# Patient Record
Sex: FEMALE | Race: WHITE | NOT HISPANIC OR LATINO | URBAN - METROPOLITAN AREA
[De-identification: names, ages, dates, MRNs, and addresses within clinical notes are randomized per-mention and may not be internally consistent; named-entity substitution may affect disease eponyms.]

---

## 2017-01-26 ENCOUNTER — OFFICE VISIT - RIVER FALLS (OUTPATIENT)
Dept: FAMILY MEDICINE | Facility: CLINIC | Age: 72
End: 2017-01-26

## 2017-01-26 ASSESSMENT — MIFFLIN-ST. JEOR: SCORE: 1231.94

## 2017-04-07 ENCOUNTER — OFFICE VISIT - RIVER FALLS (OUTPATIENT)
Dept: FAMILY MEDICINE | Facility: CLINIC | Age: 72
End: 2017-04-07

## 2017-04-07 ASSESSMENT — MIFFLIN-ST. JEOR: SCORE: 1231.94

## 2017-04-10 ENCOUNTER — OFFICE VISIT - RIVER FALLS (OUTPATIENT)
Dept: FAMILY MEDICINE | Facility: CLINIC | Age: 72
End: 2017-04-10

## 2017-04-10 ASSESSMENT — MIFFLIN-ST. JEOR: SCORE: 1258.59

## 2018-04-08 ENCOUNTER — OFFICE VISIT - RIVER FALLS (OUTPATIENT)
Dept: FAMILY MEDICINE | Facility: CLINIC | Age: 73
End: 2018-04-08

## 2021-06-17 ENCOUNTER — OFFICE VISIT - RIVER FALLS (OUTPATIENT)
Dept: FAMILY MEDICINE | Facility: CLINIC | Age: 76
End: 2021-06-17

## 2021-08-10 ENCOUNTER — OFFICE VISIT - RIVER FALLS (OUTPATIENT)
Dept: FAMILY MEDICINE | Facility: CLINIC | Age: 76
End: 2021-08-10

## 2022-02-12 VITALS
SYSTOLIC BLOOD PRESSURE: 126 MMHG | DIASTOLIC BLOOD PRESSURE: 87 MMHG | TEMPERATURE: 98.5 F | BODY MASS INDEX: 31.93 KG/M2 | BODY MASS INDEX: 32.29 KG/M2 | SYSTOLIC BLOOD PRESSURE: 138 MMHG | TEMPERATURE: 98 F | WEIGHT: 179.4 LBS | WEIGHT: 177.4 LBS | DIASTOLIC BLOOD PRESSURE: 74 MMHG | HEART RATE: 65 BPM | HEART RATE: 71 BPM

## 2022-02-12 VITALS
HEIGHT: 63 IN | HEART RATE: 68 BPM | DIASTOLIC BLOOD PRESSURE: 96 MMHG | HEART RATE: 77 BPM | WEIGHT: 178 LBS | BODY MASS INDEX: 31.83 KG/M2 | BODY MASS INDEX: 31.54 KG/M2 | DIASTOLIC BLOOD PRESSURE: 92 MMHG | WEIGHT: 173 LBS | TEMPERATURE: 97.6 F | SYSTOLIC BLOOD PRESSURE: 144 MMHG | HEIGHT: 62 IN | SYSTOLIC BLOOD PRESSURE: 163 MMHG

## 2022-02-12 VITALS
HEIGHT: 62 IN | WEIGHT: 173 LBS | BODY MASS INDEX: 31.83 KG/M2 | SYSTOLIC BLOOD PRESSURE: 141 MMHG | HEART RATE: 63 BPM | DIASTOLIC BLOOD PRESSURE: 79 MMHG

## 2022-02-12 VITALS
DIASTOLIC BLOOD PRESSURE: 82 MMHG | BODY MASS INDEX: 33.33 KG/M2 | WEIGHT: 185.2 LBS | OXYGEN SATURATION: 97 % | SYSTOLIC BLOOD PRESSURE: 162 MMHG | HEART RATE: 67 BPM | TEMPERATURE: 97.6 F

## 2022-02-15 NOTE — TELEPHONE ENCOUNTER
---------------------  From: Yaima Rivas CMA (Phone Messages Pool ([id number]_[facility]))   To: JDL Message Pool ([id number]_[place] [facility] [place]);     Sent: 6/17/2021 9:41:31 AM CDT  Subject: phone note- CPAP supplies rx     Phone Message    PCP:    none      Time of Call:  9:36am       Person Calling:  Kelly  Phone number:  [phone]    Note:  Patient called stating [facility] is requesting new rx for patient to get her CPAP supplies. Patient is wondering if [facility] can write rx and fax to [facility]? Please advise      Last office visit and reason:  CELSO 4/7/17 4/8/18 allergic reaction    Transferred to: JDL message pool---------------------  From: Ta/Zunilda WINSTON ([facility] Message Pool ([id number]_[place] [facility] [place]))   To: Ramon Solis MD;     Sent: 6/17/2021 9:58:18 AM CDT  Subject: FW: phone note- CPAP supplies rx---------------------  From: Ramon Solis MD   To: [facility]DL Message Pool ([id number]_[place] [facility] [place]);     Sent: 6/17/2021 10:20:19 AM CDT  Subject: RE: phone note- CPAP supplies rx     Need a device download ain visit. I last sqw her in 2017Patient notified and was transferred to scheduling.

## 2022-02-15 NOTE — PROGRESS NOTES
Chief Complaint    Patient here today to f/u sleep apnea- CPAP compliance.  History of Present Illness       Patient with moderate obstructive sleep apnea here for follow-up.  She uses the device every night but does not believe she feels any better than before starting.  She has no daytime sleepiness.  Blood pressure is controlled.  Review of Systems       No heartburn, headache, edema, chest pain, dyspnea.  Physical Exam   Vitals & Measurements    T: 98.0  F (Tympanic)  HR: 65 (Peripheral)  BP: 126/87     WT: 179.4 lb        Patient is a healthy-appearing woman in no distress.  Alert and oriented.  Mallampati 1.  Assessment/Plan       Moderate obstructive sleep apnea (G47.33)          Patient is subjectively and objectively compliant with CPAP and objectively benefiting with greatly decreased apnea-hypopnea index.  Unfortunately she has not noticed any difference in the way she feels.  Not sleepy.  Continue to use CPAP.         Ordered:          87628 office o/p est low 20-29 min (Charge), Quantity: 1, Moderate obstructive sleep apnea  Obese                Obese (Probable) (E66.9)          Encouraged weight loss.         Ordered:          33424 office o/p est low 20-29 min (Charge), Quantity: 1, Moderate obstructive sleep apnea  Obese           Patient Information     Name:MÓNICA RODNEY      Address:      [address] [place] [phone]     Sex:Female     YOB: 1945     [phone]     Emergency Contact:MARVEL JAMES     MRN:[id number]     FIN:[id number]     Location:[facility]     Date of Service:08/10/2021      Primary Care Physician:       NONE ,       Attending Physician:       Ramon Solis MD, [phone]  Problem List/Past Medical History    Ongoing     Allergic rhinitis     Allergy to food     DDD (degenerative disc disease), lumbar     DJD (degenerative joint disease)     Factor II deficiency       Comments: Clotting risk     Fibromyalgia     GERD  (gastroesophageal reflux disease)     Hyperlipidemia     Hypertension     Hypothyroidism     Moderate obstructive sleep apnea       Comments: HST on 1/9/17 with AHI 22.7 and oximetry alyx 80%.     Obese     Recurrent sinusitis     Seasonal allergies     Tubulovillous adenoma    Historical     *Hospitalized@[facility] - Chest pain     Chicken pox     Pregnancy     Pregnancy     Pregnancy  Procedure/Surgical History     Polysomnogram - HST (01/09/2017)      Comments: AHI of 22.7/hr.  Lowest Desat 80%.     Laparoscopic cholecystectomy (03/31/2016)     Cardiac computed tomography for calcium scoring (03/28/2016)      Comments: Total calcium score is 1.     Colonoscopy (04/24/2015)      Comments: Sedation: MAC      Indication: personal history of colon polps      4-6mm sessile serrated x1, hyperplastic x2      Repeat in 5 years.     L4 - L5 spinal decompression (05/15/2014)     Colonoscopy (04/16/2010)      Comments: Pt had hyperplastic polyps x2, no family hx of colon polyps/cancer and no personal hx colon polyps/cancer.   Pt to repeat in 10 yrs--due 4/16/2020 w/ MAC sedation.. Per previous colonoscopy done in 1998, pt had tubular adenoma and does require 5yr f/u colonoscopies.. Pt had hyperplastic polyps x2 measuring 6mm apiece.   Pt also received MAC sedation.. Return 5-10 years.     FS - Flexible sigmoidoscopy (03/04/2005)     Colonoscopy (01/11/2000)     Appendectomy (2000)     Colonoscopy (12/1998)     Polypectomy (1998)     Esophagoduodenostomy (1989)     Hysterectomy (1988)     Carpal tunnel release (1985)     History of shoulder surgery (1984)      Comments: Left.  Medications    Albuterol (Eqv-ProAir HFA) 90 mcg/inh inhalation aerosol, 2 puff(s), Inhale, q6 hrs    atenolol 25 mg oral tablet, 25 mg= 1 tab(s), Oral, daily    Auto PAP tiral 4- 10 cm water pressure, See Instructions, 11 refills    AutoPAP 4-11 cm water pressure, See Instructions, 11 refills    calcium-vitamin D, 500 mg, bid    CeleBREX 200 mg oral  capsule, 200 mg= 1 cap(s), Oral, daily, 3 refills    cetirizine 10 mg oral tablet, 10 mg= 1 tab(s), Oral, daily, PRN    Daily Multiple Vitamins, Oral, daily    Ecotrin 325 mg oral delayed release tablet, 325 mg= 1 tab(s), Oral, daily    levothyroxine 112 mcg (0.112 mg) oral tablet, 112 mcg= 1 tab(s), Oral, daily, 3 refills    omeprazole 20 mg oral delayed release capsule, 20 mg= 1 cap(s), Oral, daily, 3 refills    Simply Sleep, 25 mg, Oral, hs    triamcinolone 0.1% topical cream, 1 karuna, Topical, tid  Allergies    Cipro (Hives)    Crestor (abd pain)    Feldene (Hives)    Flax Seed Oil (face swelling)    aspirin (GI Upset)  Social History    Smoking Status     Never smoker     Alcohol      Current, Wine (5 oz), 1 time per month, 1 drinks/episode average.     Electronic Cigarette/Vaping      Electronic Cigarette Use: Never.     Employment/School      Retired     Exercise      Exercise frequency: No regular exercise..     Home/Environment      Marital status: . Spouse/Partner name: Ed Quiñones. Risks in environment: Unlocked guns.     Nutrition/Health      Type of diet: Regular.     Sexual      Sexually active: No. Sexual orientation: Heterosexual.     Substance Abuse      Never     Tobacco      Never (less than 100 in lifetime)  Family History    Alzheimer disease: Mother.    Arthritis: Sister, Brother and Brother.    Cancer: Father.    Clotting disorder: Sister.    Diverticulitis: Mother.    Heart attack: Son.    Hypertension: Mother, Brother, Brother and Daughter.    Sleep apnea syndrome: Sister, Brother and Brother.    Spina bifida: Son.    Thyroid disorder: Mother.    Son: History is negative  Immunizations          Scheduled Immunizations          Dose Date(s)          influenza virus vaccine, inactivated          10/14/2013          pneumococcal (PCV13)          03/25/2015          tetanus/diphth/pertuss (Tdap) adult/adol          07/26/2016          Other Immunizations          influenza           09/15/2009          pneumococcal          11/01/2005          influenza virus vaccine, inactivated          10/03/2011, 10/01/2012, 09/19/2014, 10/06/2015, 10/12/2016          pneumococcal (PPSV23)          11/02/2005          Td          02/18/2005, 02/18/2005          ZOS, shingles          01/01/2015          influenza, H1N1, inactivated          12/10/2009, 10/11/2010  Diagnostic Results    CPAP compliance report reveals usage on 100% of nights over 4 hours with an average of 7 hours and 32 minutes per night.  AHI 1.6.

## 2022-02-15 NOTE — PROGRESS NOTES
Chief Complaint  follow up sleep study  History of Present Illness  The patient has been on AutoPap for a month and feels much better her energy level and mood are greatly improved is very satisfied with it.  Review of Systems  No heartburn, weight gain, nocturia, headache, chest pain, dyspnea, edema.  Problem List/Past Medical History  Ongoing  Allergic Rhinitis  DDD (Degenerative Disc Disease), Lumbar  DJD (Degenerative Joint Disease)  Factor II deficiency  Fibromyalgia  GERD (Gastroesophageal Reflux Disease)  Hyperlipidemia  Hypertension  Hypothyroidism  Moderate obstructive sleep apnea  Obese  Recurrent Sinusitis  Seasonal Allergies  Tubulovillous Adenoma  Resolved  *Hospitalized@[facility] - Chest pain  Chicken Pox  Pregnancy  Pregnancy  Pregnancy  Procedure/Surgical History  Polysomnogram (01/09/2017),  Laparoscopic cholecystectomy (03/31/2016),  Cardiac computed tomography for calcium scoring (03/28/2016),  Colonoscopy (04/24/2015),  L4 - L5 spinal decompression (05/15/2014),  Colonoscopy (04/16/2010),  FS - Flexible sigmoidoscopy (03/04/2005),  Colonoscopy (01/11/2000),  Appendectomy (2000),  Colonoscopy (12/1998),  Polypectomy (1998),  Esophagoduodenostomy (1989),  Hysterectomy (1988),  Carpal tunnel release (1985),  History of shoulder surgery (1984).  Home Medications  atenolol 25 mg oral tablet, 25 mg, 1 tab(s), po, daily, 3 refills  Auto PAP tiral 4- 10 cm water pressure, 11 refills  calcium-vitamin D, 500 mg, bid  CeleBREX 200 mg oral capsule, 200 mg, 1 cap(s), po, daily, 3 refills  Daily Multiple Vitamins, po, daily  Ecotrin 325 mg oral delayed release tablet, 325 mg, 1 tab(s), po, daily  levothyroxine 112 mcg (0.112 mg) oral tablet, 112 mcg, 1 tab(s), po, daily  omeprazole 20 mg oral delayed release capsule, 20 mg, 1 cap(s), po, daily  Allergies  Cipro?(Hives)  Crestor?(abd pain)  Feldene?(Hives)  Flax Seed Oil?(face swelling)  aspirin?(GI Upset)  Social History  Alcohol  1-2 times per month  Employment  and Education  Retired  Exercise and Physical Activity  Exercise frequency: No regular exercise..  Home and Environment  Marital status: . Spouse/Partner name: Ed Quiñones.  Family History  Alzheimer disease: Mother.  Cancer: Father.  Clotting disorder: Sister.  Diverticulitis: Mother.  Hypertension: Mother , Brother , Brother  and Daughter.  Sleep apnea syndrome: Sister , Brother  and Brother.  Spina bifida: Son.  Thyroid disorder: Mother.  Immunizations  Physical Exam  Vitals & Measurements  HR:?77?(Peripheral)?  BP:?163/92?  HT:?62.25?in?  WT:?173?lb?  BMI:?31.39?  Patient is in no distress. ?Alert and oriented.  Lab Results  Diagnostic Results  AutoPap reveals average usage 7 hours and 15 minutes she is using over 4 hours every night 95th percentile for pressure is 10.9 cm of water pressure AHI is 2.3 acceptable week.  Assessment/Plan  Moderate obstructive sleep apnea  Subjectively and objectively compliant with CPAP. ?Subjectively and objectively improved with CPAP.  Continue AutoPap for CPAP 11 cm of water pressure alternatively.

## 2022-02-15 NOTE — PROGRESS NOTES
Chief Complaint  sleep study results  History of Present Illness  We do not stock is a 71-year-old here for follow-up of sleep test. ?She is snoring, witnessed apneas, and daytime sleepiness of 20 years duration. ?She sleeps from approximately . ?She has hypertension.  Review of Systems  She has a history of acid reflux. ?No recent weight gain. ?Nocturia 0-1 times per night. ?No headache. ?No chest pain, dyspnea, edema. ?Her 3 siblings all have sleep apnea.  Problem List/Past Medical History  Ongoing  Allergic Rhinitis  DDD (Degenerative Disc Disease), Lumbar  DJD (Degenerative Joint Disease)  Factor II deficiency  Fibromyalgia  GERD (Gastroesophageal Reflux Disease)  Hyperlipidemia  Hypertension  Hypothyroidism  Moderate obstructive sleep apnea  Obese  Recurrent Sinusitis  Seasonal Allergies  Tubulovillous Adenoma  Resolved  *Hospitalized@[facility] - Chest pain  Chicken Pox  Pregnancy  Pregnancy  Pregnancy  Procedure/Surgical History  Polysomnogram (01/09/2017),  Laparoscopic cholecystectomy (03/31/2016),  Cardiac computed tomography for calcium scoring (03/28/2016),  Colonoscopy (04/24/2015),  L4 - L5 spinal decompression (05/15/2014),  Colonoscopy (04/16/2010),  FS - Flexible sigmoidoscopy (03/04/2005),  Colonoscopy (01/11/2000),  Appendectomy (2000),  Colonoscopy (12/1998),  Polypectomy (1998),  Esophagoduodenostomy (1989),  Hysterectomy (1988),  Carpal tunnel release (1985),  History of shoulder surgery (1984).  Home Medications  atenolol 25 mg oral tablet, 25 mg, 1 tab(s), po, daily, 3 refills  Auto PAP tiral 4- 10 cm water pressure, 11 refills  calcium-vitamin D, 500 mg, bid  CeleBREX 200 mg oral capsule, 200 mg, 1 cap(s), po, daily, 3 refills  Daily Multiple Vitamins, po, daily  Ecotrin 325 mg oral delayed release tablet, 325 mg, 1 tab(s), po, daily  levothyroxine 112 mcg (0.112 mg) oral tablet, 112 mcg, 1 tab(s), po, daily, 3 refills  omeprazole 20 mg oral delayed release capsule, 20 mg, 1 cap(s), po,  daily, 3 refills  Allergies  Cipro?(Hives)  Crestor?(abd pain)  Feldene?(Hives)  Flax Seed Oil?(face swelling)  aspirin?(GI Upset)  Social History  Alcohol  1-2 times per month  Employment and Education  Retired  Exercise and Physical Activity  Exercise frequency: No regular exercise..  Home and Environment  Marital status: . Spouse/Partner name: Ed Quiñones.  Family History  Alzheimer disease: Mother.  Cancer: Father.  Clotting disorder: Sister.  Diverticulitis: Mother.  Hypertension: Mother , Brother , Brother  and Daughter.  Sleep apnea syndrome: Sister , Brother  and Brother.  Spina bifida: Son.  Thyroid disorder: Mother.  Immunizations  Physical Exam  Vitals & Measurements  HR:?63?(Peripheral)?  BP:?141/79?  HT:?62.25?in?  WT:?173?lb?  BMI:?31.39?  Patient is alert and oriented. ?In no distress. ?Airway Mallampati 3. ?Neck supple no adenopathy or thyromegaly. ?Chest clear to auscultation percussion precordium regular. ?Extremities have no edema.  Lab Results  Diagnostic Results  HST revealed an AHI of 22 and oximetry alyx of 80%.  Assessment/Plan  Hypertension  .  Ordered:  Miscellaneous Rx Supply, Auto PAP tiral 4- 10 cm water pressure, See Instructions, Instructions: Heated humidifier, heated tubing, filters, nasal or full face mask of choice with headgear. Replacement cushions and supplies as needed. Change supplies every 6 mos Months = 99...  Moderate obstructive sleep apnea  Moderate and perhaps severe obstructive sleep apnea. ?Discussed in lab versus home AutoPap titration she prefers the latter  Ordered:  Miscellaneous Rx Supply, Auto PAP tiral 4- 10 cm water pressure, See Instructions, Instructions: Heated humidifier, heated tubing, filters, nasal or full face mask of choice with headgear. Replacement cushions and supplies as needed. Change supplies every 6 mos Months = 99...  Return to Clinic (Request)

## 2022-02-15 NOTE — PROGRESS NOTES
Chief Complaint    Needing Rx for CPAP supplies to [facility] in .  History of Present Illness       Patient would like a prescription for CPAP.  She is very happy with his CPAP.  She uses it every night.  No daytime sleepiness.  Her blood pressure is controlled by her home readings.  Review of Systems       No heartburn, headache, edema, chest pain, dyspnea.  Physical Exam   Vitals & Measurements    T: 98.5  F (Tympanic)  HR: 71 (Peripheral)  BP: 138/74     WT: 177.4 lb        Patient is a healthy-appearing woman in no distress.  Alert and oriented.  HEENT exam is Mallampati 1.  Neck is not particularly thick.  Chest clear.  Cardiac exam regular.  No edema.  Cranial nerves normal.  Assessment/Plan       Hypertension (I10)          Controlled by patient's home readings.         Ordered:          Miscellaneous Rx Supply, AutoPAP 4-11 cm water pressure, See Instructions, Instructions: Heated humidifier, heated tubing, filters, nasal or full face mask of choice with headgear. Replacement cushions and supplies as needed. Change supplies every 6 mos Months = 99 (Lifeti..., (Ordered)          42242 office o/p est low 20-29 min (Charge), Quantity: 1, Moderate obstructive sleep apnea  Hypertension                Moderate obstructive sleep apnea (G47.33)          Patient is subjectively and objectively compliant with CPAP and clinically benefiting with decreased sleepiness and decreased AHI.Pop         Ordered:          Miscellaneous Rx Supply, AutoPAP 4-11 cm water pressure, See Instructions, Instructions: Heated humidifier, heated tubing, filters, nasal or full face mask of choice with headgear. Replacement cushions and supplies as needed. Change supplies every 6 mos Months = 99 (Lifeti..., (Ordered)          51729 office o/p est low 20-29 min (Charge), Quantity: 1, Moderate obstructive sleep apnea  Hypertension           Patient Information     Name:MÓNICA RODNEY      Address:      [address] [place] [address]      [place] [id number]     Sex:Female     YOB: 1945     [phone]     Emergency Contact:MARVEL JAMES     MRN:[id number]     FIN:[id number]     Location:[facility]     Date of Service:06/17/2021      Primary Care Physician:       NONE ,       Attending Physician:       Ramon Solis MD, [phone]  Problem List/Past Medical History    Ongoing     Allergic rhinitis     Allergy to food     DDD (degenerative disc disease), lumbar     DJD (degenerative joint disease)     Factor II deficiency       Comments: Clotting risk     Fibromyalgia     GERD (gastroesophageal reflux disease)     Hyperlipidemia     Hypertension     Hypothyroidism     Moderate obstructive sleep apnea       Comments: HST on 1/9/17 with AHI 22.7 and oximetry alyx 80%.     Obese     Recurrent sinusitis     Seasonal allergies     Tubulovillous adenoma    Historical     *Hospitalized@[facility] - Chest pain     Chicken pox     Pregnancy     Pregnancy     Pregnancy  Procedure/Surgical History     Polysomnogram - HST (01/09/2017)      Comments: AHI of 22.7/hr.  Lowest Desat 80%.     Laparoscopic cholecystectomy (03/31/2016)     Cardiac computed tomography for calcium scoring (03/28/2016)      Comments: Total calcium score is 1.     Colonoscopy (04/24/2015)      Comments: Sedation: MAC      Indication: personal history of colon polps      4-6mm sessile serrated x1, hyperplastic x2      Repeat in 5 years.     L4 - L5 spinal decompression (05/15/2014)     Colonoscopy (04/16/2010)      Comments: Pt had hyperplastic polyps x2, no family hx of colon polyps/cancer and no personal hx colon polyps/cancer.   Pt to repeat in 10 yrs--due 4/16/2020 w/ MAC sedation.. Per previous colonoscopy done in 1998, pt had tubular adenoma and does require 5yr f/u colonoscopies.. Return 5-10 years. Pt had hyperplastic polyps x2 measuring 6mm apiece.   Pt also received MAC sedation..     FS - Flexible sigmoidoscopy (03/04/2005)     Colonoscopy (01/11/2000)      Appendectomy (2000)     Colonoscopy (12/1998)     Polypectomy (1998)     Esophagoduodenostomy (1989)     Hysterectomy (1988)     Carpal tunnel release (1985)     History of shoulder surgery (1984)      Comments: Left.  Medications    atenolol 25 mg oral tablet, 25 mg= 1 tab(s), Oral, daily    Auto PAP tiral 4- 10 cm water pressure, See Instructions, 11 refills    AutoPAP 4-11 cm water pressure, See Instructions, 11 refills    calcium-vitamin D, 500 mg, bid    CeleBREX 200 mg oral capsule, 200 mg= 1 cap(s), Oral, daily, 3 refills    cetirizine 10 mg oral tablet, 10 mg= 1 tab(s), Oral, daily, PRN    Daily Multiple Vitamins, Oral, daily    Ecotrin 325 mg oral delayed release tablet, 325 mg= 1 tab(s), Oral, daily    levothyroxine 112 mcg (0.112 mg) oral tablet, 112 mcg= 1 tab(s), Oral, daily, 3 refills    metoprolol tartrate 25 mg oral tablet, 25 mg= 1 tab(s), Oral, bid    omeprazole 20 mg oral delayed release capsule, 20 mg= 1 cap(s), Oral, daily, 3 refills    predniSONE 20 mg oral tablet, 40 mg= 2 tab(s), Oral, daily    Simply Sleep, 25 mg, Oral, hs    triamcinolone 0.1% topical cream, 1 karuna, Topical, tid  Allergies    Cipro (Hives)    Crestor (abd pain)    Feldene (Hives)    Flax Seed Oil (face swelling)    aspirin (GI Upset)  Social History    Smoking Status     Never smoker     Alcohol      Current, Wine (5 oz), 1 time per month, 1 drinks/episode average.     Electronic Cigarette/Vaping      Electronic Cigarette Use: Never.     Employment/School      Retired     Exercise      Exercise frequency: No regular exercise..     Home/Environment      Marital status: . Spouse/Partner name: Ed Quiñones. Risks in environment: Unlocked guns.     Nutrition/Health      Type of diet: Regular.     Sexual      Sexually active: No. Sexual orientation: Heterosexual.     Substance Abuse      Never     Tobacco      Never (less than 100 in lifetime)  Family History    Alzheimer disease: Mother.    Arthritis: Sister, Brother  and Brother.    Cancer: Father.    Clotting disorder: Sister.    Diverticulitis: Mother.    Heart attack: Son.    Hypertension: Mother, Brother, Brother and Daughter.    Sleep apnea syndrome: Sister, Brother and Brother.    Spina bifida: Son.    Thyroid disorder: Mother.    Son: History is negative  Immunizations          Scheduled Immunizations          Dose Date(s)          influenza virus vaccine, inactivated          10/14/2013          pneumococcal (PCV13)          03/25/2015          tetanus/diphth/pertuss (Tdap) adult/adol          07/26/2016          Other Immunizations          influenza          09/15/2009          pneumococcal          11/01/2005          influenza virus vaccine, inactivated          10/03/2011, 10/01/2012, 09/19/2014, 10/06/2015, 10/12/2016          pneumococcal (PPSV23)          11/02/2005          Td          02/18/2005, 02/18/2005          ZOS, shingles          01/01/2015          influenza, H1N1, inactivated          12/10/2009, 10/11/2010  Diagnostic Results    Compliance report indicates usage of over 4 hours per night on 97% of nights, average 7 hours and 7 minutes per night, AHI 2.0.

## 2022-02-15 NOTE — NURSING NOTE
Quick Intake Entered On:  6/17/2021 2:03 PM CDT    Performed On:  6/17/2021 2:02 PM CDT by Ramon Solis MD               Summary   Menstrual Status :   Postmenopausal   Systolic Blood Pressure :   138 mmHg (HI)    Diastolic Blood Pressure :   74 mmHg   Mean Arterial Pressure :   95 mmHg   BP Site :   Right arm   BP Method :   Manual   Race :      Languages :   English   Ethnicity :   Not  or    Ramon Solis MD - 6/17/2021 2:02 PM CDT

## 2022-02-15 NOTE — PROGRESS NOTES
Patient:   MÓNICA RODNEY            MRN: [id number]            FIN: [id number]               Age:   73 years     Sex:  Female     :  1945   Associated Diagnoses:   Allergic dermatitis   Author:   Rigo Farias MD      Visit Information      Date of Service: 2018 10:56 am  Performing Location: [facility]  Encounter#: [id number]      Primary Care Provider (PCP):  NONE ,       Referring Provider:  Rigo Farias MD    NPI# [id number]      Chief Complaint   2018 10:59 AM CDT    Possible allergic reaction to facial cream.  Face is hot, eyes swollen and itching.      Subjective   Chief complaint 2018 10:59 AM CDT    Possible allergic reaction to facial cream.  Face is hot, eyes swollen and itching..     This 73-year-old is here because her face is hot and swollen.  It started yesterday.  She applied a new Neutrogena face cream.  It is one she had been using for a long time but this had sunscreen in it.  She says that is what she seemed to react to.  She took some Benadryl last night, which helped, but it was still there this morning so she decided to come in.  She has had no trouble with her breathing.  No throat swelling.  She can eat well.  No nausea or vomiting.             Health Status   Allergies:    Allergic Reactions (Selected)  Severity Not Documented  Cipro (Hives)  Crestor (Abd pain)  Feldene (Hives)  Flax Seed Oil (Face swelling)  Nonallergic Reactions (Selected)  Severity Not Documented  Aspirin (Gi upset)   Medications:  (Selected)   Prescriptions  Prescribed  Auto PAP tiral 4- 10 cm water pressure: Auto PAP tiral 4- 10 cm water pressure, See Instructions, Instructions: Heated humidifier, heated tubing, filters, nasal or full face mask of choice with headgear.  Replacement cushions and supplies as needed.  Change supplies every 6 mos  Months = 99...  CeleBREX 200 mg oral capsule: 1 cap(s) ( 200 mg ), PO, Daily, # 90 cap(s), 3 Refill(s), Type:  Maintenance  cetirizine 10 mg oral tablet: 1 tab(s) ( 10 mg ), PO, Daily, PRN: for allergy symptoms, # 10 tab(s), 0 Refill(s), Type: Maintenance, Pharmacy: [id number], 1 tab(s) po daily,PRN:for allergy symptoms  levothyroxine 112 mcg (0.112 mg) oral tablet: 1 tab(s) ( 112 mcg ), po, daily, # 90 tab(s), 3 Refill(s), Type: Maintenance, Pharmacy: [facility], 1 tab(s) po daily  omeprazole 20 mg oral delayed release capsule: 1 cap(s) ( 20 mg ), po, daily, # 90 cap(s), 3 Refill(s), Type: Maintenance, Pharmacy: [facility], due for visit, 1 cap(s) po daily  predniSONE 20 mg oral tablet: 2 tab(s) ( 40 mg ), PO, Daily, # 6 tab(s), 0 Refill(s), Type: Maintenance, Pharmacy: [id number], 2 tab(s) po daily,x3 day(s)  triamcinolone 0.1% topical cream: 1 karuna, TOP, TID, Instructions: apply a thin film  to affected area, # 30 g, 0 Refill(s), Type: Maintenance, Pharmacy: [id number], 1 karuna top tid,Instr:apply a thin film; to affected area  Documented Medications  Documented  Daily Multiple Vitamins: PO, Daily, 0  Ecotrin 325 mg oral delayed release tablet: 1 tab(s) ( 325 mg ), PO, Daily, # 30 tab(s), 0 Refill(s), Type: Maintenance  atorvastatin 20 mg oral tablet: 1 tab(s) ( 20 mg ), PO, every other day, # 90 tab(s), 0 Refill(s), Type: Maintenance  calcium-vitamin D: ( 500 mg ), BID, 0  metoprolol tartrate 25 mg oral tablet: 1 tab(s) ( 25 mg ), po, bid, 0 Refill(s), Type: Maintenance   Problem list:    All Problems (Selected)  Hypothyroidism / 94105591 / Confirmed  DDD (degenerative disc disease), lumbar / 45575570 / Confirmed  GERD (gastroesophageal reflux disease) / 6406163666 / Confirmed  Hyperlipidemia / 02607250 / Confirmed  DJD (degenerative joint disease) / 0304995514 / Confirmed  Recurrent sinusitis / 414471049 / Confirmed  Tubulovillous adenoma / 78314859 / Confirmed  Allergic rhinitis / 983786510 / Confirmed  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      Objective   Vital Signs   4/8/2018 10:59 AM CDT Temperature Tympanic 97.6 DegF  LOW    Peripheral Pulse Rate 67 bpm    Systolic Blood Pressure 162 mmHg  HI    Diastolic Blood Pressure 82 mmHg  HI    Mean Arterial Pressure 109 mmHg    Oxygen Saturation 97 %      Measurements from flowsheet : Measurements   4/8/2018 10:59 AM CDT    Weight Measured - Standard                185.2 lb     General:  Alert and oriented, No acute distress.    Neck:  No lymphadenopathy.    Respiratory:  Lungs are clear to auscultation, Respirations are non-labored.    Cardiovascular:  Normal rate.    Face has a pink, slightly swollen nature to it.   There are no specific lesions.        Impression and Plan   Assessment and Plan:          Diagnosis: Allergic dermatitis ([id number] L23.9).         Course: Allergic reaction.    She will try Cetirizine, three days of Prednisone, and some Triamcinolone.  She will not use the cream with sunscreen any more .

## 2022-02-15 NOTE — NURSING NOTE
Comprehensive Intake Entered On:  8/10/2021 10:57 AM CDT    Performed On:  8/10/2021 10:52 AM CDT by Zunilda Little               Summary   Chief Complaint :   Patient here today to f/u sleep apnea- CPAP compliance.    Menstrual Status :   Postmenopausal   Weight Measured :   179.4 lb(Converted to: 179 lb 6 oz, 81.374 kg)    Systolic Blood Pressure :   126 mmHg   Diastolic Blood Pressure :   87 mmHg (HI)    Mean Arterial Pressure :   100 mmHg   Peripheral Pulse Rate :   65 bpm   BP Site :   Right arm   BP Method :   Electronic   HR Method :   Electronic   Temperature Tympanic :   98.0 DegF(Converted to: 36.7 DegC)    Race :      Languages :   English   Ethnicity :   Not  or    Zunilda Little - 8/10/2021 10:52 AM CDT   Health Status   Allergies Verified? :   Yes   Medication History Verified? :   Yes   Medical History Verified? :   Yes   Pre-Visit Planning Status :   Completed   Tobacco Use? :   Never smoker   Zunilda Little - 8/10/2021 10:52 AM CDT   Consents   Consent for Immunization Exchange :   Consent Granted   Consent for Immunizations to Providers :   Consent Granted   Zunilda Little - 8/10/2021 10:52 AM CDT   Meds / Allergies   (As Of: 8/10/2021 10:57:14 AM CDT)   Allergies (Active)   aspirin  Estimated Onset Date:   Unspecified ; Reactions:   GI Upset ; Created By:   Litzy Marquez; Reaction Status:   Active ; Category:   Drug ; Substance:   aspirin ; Type:   Intolerance ; Updated By:   Litzy Marquez; Source:   Paper Chart ; Reviewed Date:   8/10/2021 10:56 AM CDT      Cipro  Estimated Onset Date:   Unspecified ; Reactions:   Hives ; Created By:   Sylvie Caicedo; Reaction Status:   Active ; Category:   Drug ; Substance:   Cipro ; Type:   Allergy ; Updated By:   Sylvie Caicedo; Source:   Paper Chart ; Reviewed Date:   8/10/2021 10:56 AM CDT      Crestor  Estimated Onset Date:   Unspecified ; Reactions:   abd pain ; Created By:   Dayan Fernandez CMA;  Reaction Status:   Active ; Category:   Drug ; Substance:   Crestor ; Type:   Allergy ; Updated By:   Dayan Fernandez CMA; Source:   Patient ; Reviewed Date:   8/10/2021 10:56 AM CDT      Feldene  Estimated Onset Date:   <not entered> 1/1/1993 ; Reactions:   Hives ; Created By:   Litzy Marquez; Reaction Status:   Active ; Category:   Drug ; Substance:   Feldene ; Type:   Allergy ; Updated By:   Litzy Marquez; Source:   Paper Chart ; Reviewed Date:   8/10/2021 10:56 AM CDT      Flax Seed Oil  Estimated Onset Date:   Unspecified ; Reactions:   face swelling ; Created By:   Dayan Fernandez CMA; Reaction Status:   Active ; Category:   Drug ; Substance:   Flax Seed Oil ; Type:   Allergy ; Updated By:   Dayan Fernandez CMA; Reviewed Date:   8/10/2021 10:56 AM CDT        Medication List   (As Of: 8/10/2021 10:57:14 AM CDT)   Prescription/Discharge Order    triamcinolone topical  :   triamcinolone topical ; Status:   Prescribed ; Ordered As Mnemonic:   triamcinolone 0.1% topical cream ; Simple Display Line:   1 karuna, TOP, TID, apply a thin film  to affected area, 30 g, 0 Refill(s) ; Ordering Provider:   Rigo Farias MD; Catalog Code:   triamcinolone topical ; Order Dt/Tm:   4/8/2018 11:09:02 AM CDT          predniSONE  :   predniSONE ; Status:   Prescribed ; Ordered As Mnemonic:   predniSONE 20 mg oral tablet ; Simple Display Line:   40 mg, 2 tab(s), PO, Daily, for 3 day(s), 6 tab(s), 0 Refill(s) ; Ordering Provider:   Rigo Farias MD; Catalog Code:   predniSONE ; Order Dt/Tm:   4/8/2018 11:08:42 AM CDT          omeprazole  :   omeprazole ; Status:   Prescribed ; Ordered As Mnemonic:   omeprazole 20 mg oral delayed release capsule ; Simple Display Line:   20 mg, 1 cap(s), po, daily, 90 cap(s), 3 Refill(s) ; Ordering Provider:   Anali Mar; Catalog Code:   omeprazole ; Order Dt/Tm:   4/10/2017 11:39:37 AM CDT          Miscellaneous Rx Supply  :   Miscellaneous Rx Supply ; Status:    Prescribed ; Ordered As Mnemonic:   AutoPAP 4-11 cm water pressure ; Simple Display Line:   See Instructions, Heated humidifier, heated tubing, filters, nasal or full face mask of choice with headgear.  Replacement cushions and supplies as needed.  Change supplies every 6 mos  Months = 99 (Lifetime), 1 EA, 11 Refill(s) ; Ordering Provider:   Ramon Solis MD; Catalog Code:   Miscellaneous Rx Supply ; Order Dt/Tm:   6/17/2021 2:00:19 PM CDT          Miscellaneous Rx Supply  :   Miscellaneous Rx Supply ; Status:   Prescribed ; Ordered As Mnemonic:   Auto PAP tiral 4- 10 cm water pressure ; Simple Display Line:   See Instructions, Heated humidifier, heated tubing, filters, nasal or full face mask of choice with headgear.  Replacement cushions and supplies as needed.  Change supplies every 6 mos  Months = 99 (Lifetime), 1 EA, 11 Refill(s) ; Ordering Provider:   Ramon Solis MD; Catalog Code:   Miscellaneous Rx Supply ; Order Dt/Tm:   1/26/2017 10:57:59 AM CST          levothyroxine  :   levothyroxine ; Status:   Prescribed ; Ordered As Mnemonic:   levothyroxine 112 mcg (0.112 mg) oral tablet ; Simple Display Line:   112 mcg, 1 tab(s), po, daily, 90 tab(s), 3 Refill(s) ; Ordering Provider:   Anali Mar; Catalog Code:   levothyroxine ; Order Dt/Tm:   4/13/2017 1:16:07 PM CDT          cetirizine  :   cetirizine ; Status:   Prescribed ; Ordered As Mnemonic:   cetirizine 10 mg oral tablet ; Simple Display Line:   10 mg, 1 tab(s), PO, Daily, PRN: for allergy symptoms, 10 tab(s), 0 Refill(s) ; Ordering Provider:   Rigo Farias MD; Catalog Code:   cetirizine ; Order Dt/Tm:   4/8/2018 11:09:28 AM CDT          celecoxib  :   celecoxib ; Status:   Prescribed ; Ordered As Mnemonic:   CeleBREX 200 mg oral capsule ; Simple Display Line:   200 mg, 1 cap(s), PO, Daily, 90 cap(s), 3 Refill(s) ; Ordering Provider:   Anali Mar; Catalog Code:   celecoxib ; Order Dt/Tm:   4/10/2017 11:11:06 AM CDT             Home Meds    multivitamin  :   multivitamin ; Status:   Documented ; Ordered As Mnemonic:   Daily Multiple Vitamins ; Simple Display Line:   PO, Daily ; Catalog Code:   multivitamin ; Order Dt/Tm:   3/5/2010 12:52:59 PM CST          metoprolol  :   metoprolol ; Status:   Documented ; Ordered As Mnemonic:   metoprolol tartrate 25 mg oral tablet ; Simple Display Line:   25 mg, 1 tab(s), po, bid, 0 Refill(s) ; Catalog Code:   metoprolol ; Order Dt/Tm:   4/8/2018 11:02:42 AM CDT          diphenhydrAMINE  :   diphenhydrAMINE ; Status:   Documented ; Ordered As Mnemonic:   Simply Sleep ; Simple Display Line:   25 mg, Oral, hs, 0 Refill(s) ; Catalog Code:   diphenhydrAMINE ; Order Dt/Tm:   6/17/2021 1:47:30 PM CDT          calcium-vitamin D  :   calcium-vitamin D ; Status:   Documented ; Ordered As Mnemonic:   calcium-vitamin D ; Simple Display Line:   500 mg, BID ; Catalog Code:   calcium-vitamin D ; Order Dt/Tm:   3/5/2010 12:53:03 PM CST          atenolol  :   atenolol ; Status:   Documented ; Ordered As Mnemonic:   atenolol 25 mg oral tablet ; Simple Display Line:   25 mg, 1 tab(s), Oral, daily, 0 Refill(s) ; Catalog Code:   atenolol ; Order Dt/Tm:   6/17/2021 1:46:40 PM CDT          aspirin  :   aspirin ; Status:   Documented ; Ordered As Mnemonic:   Ecotrin 325 mg oral delayed release tablet ; Simple Display Line:   325 mg, 1 tab(s), PO, Daily, 30 tab(s), 0 Refill(s) ; Catalog Code:   aspirin ; Order Dt/Tm:   12/12/2016 10:36:47 AM CST          albuterol  :   albuterol ; Status:   Documented ; Ordered As Mnemonic:   Albuterol (Eqv-ProAir HFA) 90 mcg/inh inhalation aerosol ; Simple Display Line:   2 puff(s), Inhale, q6 hrs, 0 Refill(s) ; Catalog Code:   albuterol ; Order Dt/Tm:   8/10/2021 10:55:46 AM CDT

## 2022-02-15 NOTE — NURSING NOTE
Comprehensive Intake Entered On:  6/17/2021 1:49 PM CDT    Performed On:  6/17/2021 1:42 PM CDT by Zunilda Little               Summary   Chief Complaint :   Needing Rx for CPAP supplies to [facility] in .    Menstrual Status :   Postmenopausal   Weight Measured :   177.4 lb(Converted to: 177 lb 6 oz, 80.467 kg)    Systolic Blood Pressure :   144 mmHg (HI)    Diastolic Blood Pressure :   77 mmHg   Mean Arterial Pressure :   99 mmHg   Peripheral Pulse Rate :   71 bpm   BP Site :   Right arm   BP Method :   Electronic   HR Method :   Electronic   Temperature Tympanic :   98.5 DegF(Converted to: 36.9 DegC)    Race :      Languages :   English   Ethnicity :   Not  or    Zunilda Little - 6/17/2021 1:42 PM CDT   Health Status   Allergies Verified? :   Yes   Medication History Verified? :   Yes   Medical History Verified? :   Yes   Pre-Visit Planning Status :   Completed   Tobacco Use? :   Never smoker   Zunilda Little - 6/17/2021 1:42 PM CDT   Consents   Consent for Immunization Exchange :   Consent Granted   Consent for Immunizations to Providers :   Consent Granted   Zunilda Little - 6/17/2021 1:42 PM CDT   Meds / Allergies   (As Of: 6/17/2021 1:49:24 PM CDT)   Allergies (Active)   aspirin  Estimated Onset Date:   Unspecified ; Reactions:   GI Upset ; Created By:   Litzy Marquez; Reaction Status:   Active ; Category:   Drug ; Substance:   aspirin ; Type:   Intolerance ; Updated By:   Litzy Marquez; Source:   Paper Chart ; Reviewed Date:   6/17/2021 1:47 PM CDT      Cipro  Estimated Onset Date:   Unspecified ; Reactions:   Hives ; Created By:   Sylvie Caicedo; Reaction Status:   Active ; Category:   Drug ; Substance:   Cipro ; Type:   Allergy ; Updated By:   Sylvie Caicedo; Source:   Paper Chart ; Reviewed Date:   6/17/2021 1:47 PM CDT      Crestor  Estimated Onset Date:   Unspecified ; Reactions:   abd pain ; Created By:   Dayan Fernandez CMA; Reaction  Status:   Active ; Category:   Drug ; Substance:   Crestor ; Type:   Allergy ; Updated By:   Dayan Fernandez CMA; Source:   Patient ; Reviewed Date:   6/17/2021 1:47 PM CDT      Feldene  Estimated Onset Date:   <not entered> 1/1/1993 ; Reactions:   Hives ; Created By:   Litzy Marquez; Reaction Status:   Active ; Category:   Drug ; Substance:   Feldene ; Type:   Allergy ; Updated By:   Litzy Marquez; Source:   Paper Chart ; Reviewed Date:   6/17/2021 1:47 PM CDT      Flax Seed Oil  Estimated Onset Date:   Unspecified ; Reactions:   face swelling ; Created By:   Dayan Fernandez CMA; Reaction Status:   Active ; Category:   Drug ; Substance:   Flax Seed Oil ; Type:   Allergy ; Updated By:   Dayan Fernandez CMA; Reviewed Date:   6/17/2021 1:47 PM CDT        Medication List   (As Of: 6/17/2021 1:49:24 PM CDT)   Prescription/Discharge Order    celecoxib  :   celecoxib ; Status:   Prescribed ; Ordered As Mnemonic:   CeleBREX 200 mg oral capsule ; Simple Display Line:   200 mg, 1 cap(s), PO, Daily, 90 cap(s), 3 Refill(s) ; Ordering Provider:   Anali Mar; Catalog Code:   celecoxib ; Order Dt/Tm:   4/10/2017 11:11:06 AM CDT          cetirizine  :   cetirizine ; Status:   Prescribed ; Ordered As Mnemonic:   cetirizine 10 mg oral tablet ; Simple Display Line:   10 mg, 1 tab(s), PO, Daily, PRN: for allergy symptoms, 10 tab(s), 0 Refill(s) ; Ordering Provider:   Rigo Farias MD; Catalog Code:   cetirizine ; Order Dt/Tm:   4/8/2018 11:09:28 AM CDT          levothyroxine  :   levothyroxine ; Status:   Prescribed ; Ordered As Mnemonic:   levothyroxine 112 mcg (0.112 mg) oral tablet ; Simple Display Line:   112 mcg, 1 tab(s), po, daily, 90 tab(s), 3 Refill(s) ; Ordering Provider:   Anali Mar; Catalog Code:   levothyroxine ; Order Dt/Tm:   4/13/2017 1:16:07 PM CDT          Miscellaneous Rx Supply  :   Miscellaneous Rx Supply ; Status:   Prescribed ; Ordered As Mnemonic:   Auto PAP  tiral 4- 10 cm water pressure ; Simple Display Line:   See Instructions, Heated humidifier, heated tubing, filters, nasal or full face mask of choice with headgear.  Replacement cushions and supplies as needed.  Change supplies every 6 mos  Months = 99 (Lifetime), 1 EA, 11 Refill(s) ; Ordering Provider:   Ramon Solis MD; Catalog Code:   Miscellaneous Rx Supply ; Order Dt/Tm:   1/26/2017 10:57:59 AM CST          omeprazole  :   omeprazole ; Status:   Prescribed ; Ordered As Mnemonic:   omeprazole 20 mg oral delayed release capsule ; Simple Display Line:   20 mg, 1 cap(s), po, daily, 90 cap(s), 3 Refill(s) ; Ordering Provider:   Anali Mar; Catalog Code:   omeprazole ; Order Dt/Tm:   4/10/2017 11:39:37 AM CDT          predniSONE  :   predniSONE ; Status:   Prescribed ; Ordered As Mnemonic:   predniSONE 20 mg oral tablet ; Simple Display Line:   40 mg, 2 tab(s), PO, Daily, for 3 day(s), 6 tab(s), 0 Refill(s) ; Ordering Provider:   Rigo Farias MD; Catalog Code:   predniSONE ; Order Dt/Tm:   4/8/2018 11:08:42 AM CDT          triamcinolone topical  :   triamcinolone topical ; Status:   Prescribed ; Ordered As Mnemonic:   triamcinolone 0.1% topical cream ; Simple Display Line:   1 karuna, TOP, TID, apply a thin film  to affected area, 30 g, 0 Refill(s) ; Ordering Provider:   Rigo Farias MD; Catalog Code:   triamcinolone topical ; Order Dt/Tm:   4/8/2018 11:09:02 AM CDT            Home Meds    aspirin  :   aspirin ; Status:   Documented ; Ordered As Mnemonic:   Ecotrin 325 mg oral delayed release tablet ; Simple Display Line:   325 mg, 1 tab(s), PO, Daily, 30 tab(s), 0 Refill(s) ; Catalog Code:   aspirin ; Order Dt/Tm:   12/12/2016 10:36:47 AM CST          atenolol  :   atenolol ; Status:   Documented ; Ordered As Mnemonic:   atenolol 25 mg oral tablet ; Simple Display Line:   25 mg, 1 tab(s), Oral, daily, 0 Refill(s) ; Catalog Code:   atenolol ; Order Dt/Tm:   6/17/2021 1:46:40 PM CDT           atorvastatin  :   atorvastatin ; Status:   Completed ; Ordered As Mnemonic:   atorvastatin 20 mg oral tablet ; Simple Display Line:   20 mg, 1 tab(s), PO, every other day, 90 tab(s), 0 Refill(s) ; Catalog Code:   atorvastatin ; Order Dt/Tm:   4/8/2018 11:02:16 AM CDT          calcium-vitamin D  :   calcium-vitamin D ; Status:   Documented ; Ordered As Mnemonic:   calcium-vitamin D ; Simple Display Line:   500 mg, BID ; Catalog Code:   calcium-vitamin D ; Order Dt/Tm:   3/5/2010 12:53:03 PM CST          diphenhydrAMINE  :   diphenhydrAMINE ; Status:   Documented ; Ordered As Mnemonic:   Simply Sleep ; Simple Display Line:   25 mg, Oral, hs, 0 Refill(s) ; Catalog Code:   diphenhydrAMINE ; Order Dt/Tm:   6/17/2021 1:47:30 PM CDT          metoprolol  :   metoprolol ; Status:   Documented ; Ordered As Mnemonic:   metoprolol tartrate 25 mg oral tablet ; Simple Display Line:   25 mg, 1 tab(s), po, bid, 0 Refill(s) ; Catalog Code:   metoprolol ; Order Dt/Tm:   4/8/2018 11:02:42 AM CDT          multivitamin  :   multivitamin ; Status:   Documented ; Ordered As Mnemonic:   Daily Multiple Vitamins ; Simple Display Line:   PO, Daily ; Catalog Code:   multivitamin ; Order Dt/Tm:   3/5/2010 12:52:59 PM CST            ID Risk Screen   Recent Travel History :   No recent travel   Family Member Travel History :   No recent travel   Other Exposure to Infectious Disease :   Unknown   COVID-19 Testing Status :   No COVID-19 test performed   Zunilda Little - 6/17/2021 1:42 PM CDT   Social History   Social History   (As Of: 6/17/2021 1:49:24 PM CDT)   Alcohol:        Current, Wine (5 oz), 1 time per month, 1 drinks/episode average.   (Last Updated: 4/11/2017 8:22:50 AM CDT by Diane Coleman)          Tobacco:        Never   (Last Updated: 3/13/2013 10:43:07 AM CDT by Sylvie Caicedo)   Never (less than 100 in lifetime)   (Last Updated: 6/17/2021 1:43:15 PM CDT by Ta/Zunilda WINSTON          Electronic Cigarette/Vaping:         Electronic Cigarette Use: Never.   (Last Updated: 6/17/2021 1:43:19 PM CDT by Zunilda Little)          Substance Abuse:        Never   (Last Updated: 3/13/2013 10:43:15 AM CDT by Sylvie Caicedo)          Employment/School:        Retired   (Last Updated: 12/17/2010 10:55:02 AM CST by Patito Zuluaga)          Home/Environment:        Marital status: .  Spouse/Partner name: Ed Quiñones.  Risks in environment: Unlocked guns.   (Last Updated: 4/11/2017 8:23:06 AM CDT by Diane Coleman)          Nutrition/Health:        Type of diet: Regular.   (Last Updated: 4/11/2017 8:23:12 AM CDT by Diane Coleman)          Exercise:        Exercise frequency: No regular exercise..   (Last Updated: 3/13/2013 10:43:32 AM CDT by Sylvie Caicedo)          Sexual:        Sexually active: No.  Sexual orientation: Heterosexual.   (Last Updated: 4/11/2017 8:23:22 AM CDT by Diane Coleman)

## 2022-02-15 NOTE — PROGRESS NOTES
"   Patient:   MÓNICA RODNEY            MRN: [id number]            FIN: [id number]               Age:   72 years     Sex:  Female     :  1945   Associated Diagnoses:   Well woman exam; Right hip pain; Hypertension; Hypothyroidism   Author:   Anali Mar      Visit Information      Date of Service: 04/10/2017 10:50 am  Performing Location: [facility]  Encounter#: [id number]      Chief Complaint   4/10/2017 10:56 AM CDT   AWV. Mammo scheduled later this month.      History of Present Illness   Pateint is a 72 year old female with past medical history of HTN, HLD, hypothyroidism, GERD, CELSO on CPAP, obesity, fibromyalgia who presents for annual well visit. Patient has been feeling well overall. Reports injuring her right hip ~2 months ago as she bent down to  her dog. She is on celerex daily for her fibromyalgia but this has been helping her right hip pain. She is going to see her chiropractor for pain and does not want to do PT or any other interventions. Patient has hx of hypothyroidism and has been taking levothyroxine daily, however, she accidentally mixed her last two prescriptions together (levothyroxine 112 mcg and 125 mcg) so she has been taking both doses. She is compliant with her CPAP. No other concerns at this time.     Patient scheduled mammogram for 17. She does not want to have lipid testing done today. Has a hx of HLD and has tried \"every medication\" but does not tolerate statins secondary to myalgia. Follows with eye doctor yearly and dentist 2x per year.     Lives at home with her dog. ,   10 years ago of colon cancer. Has 2 children (daughter and son) and 3 grandchildren. Never smoker. Rarely drinks alcohol, ~1x per month. Not currently exercising but does do yard work at her [facility] in [place]. 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       Review of Systems   Constitutional:  Negative.    Eye:  " Negative.    Ear/Nose/Mouth/Throat:  Negative.    Respiratory:  Negative.    Cardiovascular:  Negative.    Gastrointestinal:  Negative.    Genitourinary:  Negative.    Musculoskeletal:  Negative except as documented in history of present illness.    Integumentary:  Negative.    Neurologic:  Negative.       Health Status   Allergies:    Allergic Reactions (Selected)  Severity Not Documented  Cipro (Hives)  Crestor (Abd pain)  Feldene (Hives)  Flax Seed Oil (Face swelling)  Nonallergic Reactions (Selected)  Severity Not Documented  Aspirin (Gi upset)   Medications:  (Selected)   Prescriptions  Prescribed  Auto PAP tiral 4- 10 cm water pressure: Auto PAP tiral 4- 10 cm water pressure, See Instructions, Instructions: Heated humidifier, heated tubing, filters, nasal or full face mask of choice with headgear.  Replacement cushions and supplies as needed.  Change supplies every 6 mos  Months = 99...  CeleBREX 200 mg oral capsule: 1 cap(s) ( 200 mg ), PO, Daily, # 90 cap(s), 3 Refill(s), Type: Maintenance  atenolol 25 mg oral tablet: 1 tab(s) ( 25 mg ), PO, Daily, # 90 tab(s), 3 Refill(s), Type: Maintenance, Pharmacy: [facility], 1 tab(s) po daily  levothyroxine 112 mcg (0.112 mg) oral tablet: 1 tab(s) ( 112 mcg ), po, daily, # 90 tab(s), 0 Refill(s), Type: Maintenance, Pharmacy: [facility]  omeprazole 20 mg oral delayed release capsule: 1 cap(s) ( 20 mg ), po, daily, # 30 cap(s), 0 Refill(s), Type: Maintenance, Pharmacy: [facility], due for visit  Documented Medications  Documented  Daily Multiple Vitamins: PO, Daily, 0  Ecotrin 325 mg oral delayed release tablet: 1 tab(s) ( 325 mg ), PO, Daily, # 30 tab(s), 0 Refill(s), Type: Maintenance  calcium-vitamin D: ( 500 mg ), BID, 0   Problem list:    All Problems (Selected)  Allergic Rhinitis / ICD-9-.9 / Confirmed  DDD (Degenerative Disc Disease), Lumbar / ICD-9-.52 / Confirmed  DJD (Degenerative Joint  Disease) / ICD-9-.90 / Confirmed  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  Hypothyroidism (71736737): Onset on 1995 at 49 years.  DDD (Degenerative Disc Disease), Lumbar (722.52): Onset on 1991 at 45 years.  GERD (Gastroesophageal Reflux Disease) (530.81): Onset on 1989 at 43 years.  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  Resolved on 2016 at 70 years.  Chicken Pox (052.9):  Resolved.  Pregnancy (459131505):  Resolved in  at 18 years.  Pregnancy (289656977):  Resolved in  at 19 years.  Pregnancy (749303895):  Resolved in  at 22 years.   Family History:    Cancer  Father ()  Comments:  2010 10:54 AM - Patito Zuluaga  Lung cancer  Diverticulitis  Mother ()  Sleep apnea syndrome  Brother  Brother  Sister  Hypertension  Mother ()  Brother  Brother  Daughter  Clotting disorder  Sister  Spina bifida  Son ()  Thyroid disorder  Mother  ()  Alzheimer disease  Mother ()     Procedure history:    Polysomnogram - HST (369815786) on 2017 at 71 Years.  Comments:  2017 9:21 AM - Rosa Isela Castellano CMA  AHI of 22.7/hr.  Lowest Desat 80%  Laparoscopic cholecystectomy (04512140) on 3/31/2016 at 70 Years.  Cardiac computed tomography for calcium scoring (9024143086) on 3/28/2016 at 70 Years.  Comments:  3/31/2016 11:05 AM - Elaine Samano  Total calcium score is 1  Colonoscopy (581542605) on 2015 at 70 Years.  Comments:  2015 6:53 PM - Oksana Kimble RN  Sedation: MAC  Indication: personal history of colon polps  4-6mm sessile serrated x1, hyperplastic x2  Repeat in 5 years  L4 - L5 spinal decompression on 5/15/2014 at 69 Years.  Colonoscopy (076453363) on 2010 at 65 Years.  Comments:  3/26/2015 6:41 PM - Sylvie Carbajal MA  Per previous colonoscopy done in , pt had tubular adenoma and does require 5yr f/u colonoscopies.    3/17/2015 2:57 PM - Sylvie Carbajal MA  Pt had hyperplastic polyps x2, no family hx of colon polyps/cancer and no personal hx colon polyps/cancer.   Pt to repeat in 10 yrs--due 2020 w/ MAC sedation.    3/17/2015 10:35 AM - Sylvie Carbajal MA  Pt had hyperplastic polyps x2 measuring 6mm apiece.   Pt also received MAC sedation.    2010 2:49 PM - Schoen RN, Alissa  Return 5-10 years  FS - Flexible sigmoidoscopy (5274622581) on 3/4/2005 at 59 Years.  Appendectomy (162874365) in  at 55 Years.  Colonoscopy (288613078) on 2000 at 54 Years.  Colonoscopy (664838901) in the month of 1998 at 53 Years.  Polypectomy (697434628) in  at 53 Years.  Esophagoduodenostomy (819960159) in  at 44 Years.  Hysterectomy (474768570) in  at 43 Years.  Carpal tunnel release (753727545) in  at 40 Years.  History of shoulder surgery (8919264752) in  at 39 Years.  Comments:  3/13/2013 10:46 ANDREIA - Sylvie Caicedo   Social History:        Alcohol Assessment            1-2 times per  month      Tobacco Assessment            Never      Substance Abuse Assessment            Never      Employment and Education Assessment            Retired      Home and Environment Assessment            Marital status: .  Spouse/Partner name: Ed Quiñones.      Exercise and Physical Activity Assessment            Exercise frequency: No regular exercise..      Other Assessment            Marital status,         Physical Examination   Vital Signs   4/10/2017 10:56 AM CDT Temperature Tympanic 97.6 DegF  LOW    Peripheral Pulse Rate 68 bpm    Pulse Site Radial artery    HR Method Manual    Systolic Blood Pressure 144 mmHg  HI    Diastolic Blood Pressure 96 mmHg  HI    Mean Arterial Pressure 112 mmHg    BP Site Right arm    BP Method Manual    Vital Signs Comments Takes bp at home and was 135/77 yesterday and 127/76 today.      Measurements from flowsheet : Measurements   4/10/2017 10:56 AM CDT Height Measured - Standard 62.5 in    Weight Measured - Standard 178 lb    BSA 1.88 m2    Body Mass Index 32.03 kg/m2      General:  Alert and oriented, No acute distress.    Eye:  Normal conjunctiva.    HENT:  Tympanic membranes are clear, Oral mucosa is moist, No pharyngeal erythema.    Neck:  Supple.    Respiratory:  Lungs are clear to auscultation, Respirations are non-labored.    Cardiovascular:  Normal rate, Regular rhythm.    Gastrointestinal:  Soft, Non-tender,   Breast: Examined in the supine and upright positions. No masses. No supraclavicular or axillary adenopathy. No galactorrhea.    Musculoskeletal:  Normal range of motion, Normal gait.    Integumentary:  Warm, Dry, No rash.    Neurologic:  Alert, Oriented.    Psychiatric:  Cooperative, Appropriate mood & affect.       Health Maintenance   Pap: not indicated anymore   Mammo: 04/2016, due now --> scheduled for 04/24/17  Colon: 2015, due 2020  Osteoporosis: 04/2016, due 2021  Lipids: 04/2016, patient does not want to follow up on lipid testing as she  is not willing to try any medications for her hyperlipidemia; she has not tolerated statins in the past  Tetanus: 07/2016, due 2026  TSH: 12/2016, due now  Glucose: 04/2016, due now      Review / Management   Results review:  TSH and BMP pending.       Impression and Plan   Diagnosis     Well woman exam (XZV51-CF Z01.419).     Right hip pain (BLI51-HA M25.551).     Hypertension (ZKA51-MQ I10).     Hypothyroidism (VWH60-MD E03.9).     Presents for annual well visit. Vitals normal and exam unremarkable. Health maintenance updates as above; she is scheduled for mammogram on 04/24/17. Labs ordered including TSH and BMP to assess fasting glucose and any other abnormalities given her HTN. Medication refills sent to pharmacy. Will wait to refill levothyroxine until TSH level has returned. Patient accidentally mixed her prescriptions of levothyroxine 112 mcg and 125 mcg at home so she has been taking both doses. Recommended following with her chiropractor for her right hip pain as she does not want to do PT or any other intervention at this time. She was advised to return to clinic if hip pain worsens. Encouraged her to try to exercise for 30 minutes at least 4 times per week and to focus on eating a well-balanced diet.   Instructed patient to return to clinic as needed. Patient verbalized understanding and agreement with plan. All questions were answered.    agree with exam and plan

## 2022-05-28 ENCOUNTER — MEDICAL CORRESPONDENCE (OUTPATIENT)
Dept: HEALTH INFORMATION MANAGEMENT | Facility: CLINIC | Age: 77
End: 2022-05-28

## 2024-09-30 ENCOUNTER — TELEPHONE (OUTPATIENT)
Dept: FAMILY MEDICINE | Facility: CLINIC | Age: 79
End: 2024-09-30

## 2024-09-30 NOTE — TELEPHONE ENCOUNTER
General Call    Contacts       Contact Date/Time Type Contact Phone/Fax    09/30/2024 10:58 AM CDT Phone (Incoming) Gosia Quiñones (Self) [phone] (H)          Reason for Call:  surgery done 12/11 for sleep apnea, inspire activation.    What are your questions or concerns: Pt is wondering if Dr Jaquez does inspire activation.  Needs to be done within 30 days of surgery and looking for someone she can go with for future follow up appts regarding sleep apnea   Date of last appointment with provider: couple years ago    Okay to leave a detailed message?: Yes at Cell number on file:    No relevant phone numbers on file. [phone]